# Patient Record
Sex: MALE | Race: BLACK OR AFRICAN AMERICAN | NOT HISPANIC OR LATINO | URBAN - NONMETROPOLITAN AREA
[De-identification: names, ages, dates, MRNs, and addresses within clinical notes are randomized per-mention and may not be internally consistent; named-entity substitution may affect disease eponyms.]

---

## 2020-11-12 ENCOUNTER — OFFICE VISIT (OUTPATIENT)
Dept: URBAN - NONMETROPOLITAN AREA CLINIC 2 | Facility: CLINIC | Age: 65
End: 2020-11-12
Payer: MEDICARE

## 2020-11-12 DIAGNOSIS — K51.00 ULCERATIVE PANCOLITIS WITHOUT COMPLICATION: ICD-10-CM

## 2020-11-12 PROCEDURE — 99213 OFFICE O/P EST LOW 20 MIN: CPT | Performed by: INTERNAL MEDICINE

## 2020-11-12 PROCEDURE — G8427 DOCREV CUR MEDS BY ELIG CLIN: HCPCS | Performed by: INTERNAL MEDICINE

## 2020-11-12 PROCEDURE — G8420 CALC BMI NORM PARAMETERS: HCPCS | Performed by: INTERNAL MEDICINE

## 2020-11-12 PROCEDURE — G8482 FLU IMMUNIZE ORDER/ADMIN: HCPCS | Performed by: INTERNAL MEDICINE

## 2020-11-12 RX ORDER — EXTENDED PHENYTOIN SODIUM 30 MG/1
CAPSULE ORAL
Qty: 0 | Refills: 0 | Status: ACTIVE | COMMUNITY
Start: 1900-01-01

## 2020-11-12 RX ORDER — BALSALAZIDE DISODIUM 750 MG/1
TAKE 3 CAPSULES (2,250 MG) BY ORAL ROUTE 3 TIMES PER DAY FOR 90 DAYS CAPSULE ORAL
Qty: 810 | Refills: 3 | Status: ACTIVE | COMMUNITY
Start: 2020-05-12 | End: 2021-05-07

## 2020-11-12 RX ORDER — ATORVASTATIN CALCIUM 20 MG/1
TABLET, FILM COATED ORAL
Qty: 0 | Refills: 0 | Status: ACTIVE | COMMUNITY
Start: 1900-01-01

## 2020-11-12 RX ORDER — MESALAMINE 1000 MG/1
SUPPOSITORY RECTAL
Qty: 0 | Refills: 0 | Status: ACTIVE | COMMUNITY
Start: 1900-01-01

## 2020-11-12 RX ORDER — BALSALAZIDE DISODIUM 750 MG/1
CAPSULE ORAL
Qty: 0 | Refills: 0 | Status: ACTIVE | COMMUNITY
Start: 1900-01-01

## 2020-11-12 RX ORDER — AMLODIPINE BESYLATE 10 MG/1
TABLET ORAL
Qty: 0 | Refills: 0 | Status: ACTIVE | COMMUNITY
Start: 1900-01-01

## 2020-11-12 NOTE — HPI-TODAY'S VISIT:
Mr. Salazar returns for follow-up of ulcerative colitis.  He is in deep remission and is on mesalamine for this.  He has not had a flare in years.  He is without complaints today.  He is due for his 3 year colonoscopy in May 2021.  5/12/20: Mr. Alexey Salazar returns for follow-up of ulcerative pancolitis.  Telephone call is used in lieu of video chat as he does not have access to video chat technology.  Since his last clinic visit, he has been doing well.  He remains on balsalazide TID and his this controls his diarrhea.  Previously rancho did not when he was diagnosed in 2000.  His last colonoscopy was 5/2018 and showed no active colitis, repeat in 3 years.

## 2020-11-13 ENCOUNTER — OFFICE VISIT (OUTPATIENT)
Dept: URBAN - NONMETROPOLITAN AREA CLINIC 2 | Facility: CLINIC | Age: 65
End: 2020-11-13

## 2021-05-13 ENCOUNTER — WEB ENCOUNTER (OUTPATIENT)
Dept: URBAN - NONMETROPOLITAN AREA CLINIC 2 | Facility: CLINIC | Age: 66
End: 2021-05-13

## 2021-05-13 ENCOUNTER — OFFICE VISIT (OUTPATIENT)
Dept: URBAN - NONMETROPOLITAN AREA CLINIC 2 | Facility: CLINIC | Age: 66
End: 2021-05-13
Payer: MEDICARE

## 2021-05-13 DIAGNOSIS — K51.00 ULCERATIVE PANCOLITIS WITHOUT COMPLICATION: ICD-10-CM

## 2021-05-13 PROCEDURE — 99213 OFFICE O/P EST LOW 20 MIN: CPT | Performed by: INTERNAL MEDICINE

## 2021-05-13 RX ORDER — BALSALAZIDE DISODIUM 750 MG/1
3 CAPSULES CAPSULE ORAL
Qty: 240 | Refills: 3 | OUTPATIENT
Start: 2021-05-13 | End: 2022-01-07

## 2021-05-13 RX ORDER — EXTENDED PHENYTOIN SODIUM 30 MG/1
CAPSULE ORAL
Qty: 0 | Refills: 0 | Status: ACTIVE | COMMUNITY
Start: 1900-01-01

## 2021-05-13 RX ORDER — BALSALAZIDE DISODIUM 750 MG/1
CAPSULE ORAL
Qty: 0 | Refills: 0 | Status: ACTIVE | COMMUNITY
Start: 1900-01-01

## 2021-05-13 RX ORDER — MESALAMINE 1000 MG/1
SUPPOSITORY RECTAL
Qty: 0 | Refills: 0 | Status: ACTIVE | COMMUNITY
Start: 1900-01-01

## 2021-05-13 RX ORDER — AMLODIPINE BESYLATE 10 MG/1
TABLET ORAL
Qty: 0 | Refills: 0 | Status: ACTIVE | COMMUNITY
Start: 1900-01-01

## 2021-05-13 RX ORDER — ATORVASTATIN CALCIUM 20 MG/1
TABLET, FILM COATED ORAL
Qty: 0 | Refills: 0 | Status: ACTIVE | COMMUNITY
Start: 1900-01-01

## 2021-05-13 NOTE — HPI-TODAY'S VISIT:
5/13/21: Mr. Salazar returns for follow-up of ulcerative colitis.  He is in deep remission and is on mesalamine for this.  Today he has no GI complaints.  He continues to take Colazal 9 pills daily and has been on this for almost 20 years now.   11/2020: Mr. Salazar returns for follow-up of ulcerative colitis.  He is in deep remission and is on mesalamine for this.  He has not had a flare in years.  He is without complaints today.  He is due for his 3 year colonoscopy in May 2021.  5/12/20: Mr. Alexey Salazar returns for follow-up of ulcerative pancolitis.  Telephone call is used in lieu of video chat as he does not have access to video chat technology.  Since his last clinic visit, he has been doing well.  He remains on balsalazide TID and his this controls his diarrhea.  Previously rancho did not when he was diagnosed in 2000.  His last colonoscopy was 5/2018 and showed no active colitis, repeat in 3 years.

## 2021-06-07 ENCOUNTER — CLAIMS CREATED FROM THE CLAIM WINDOW (OUTPATIENT)
Dept: URBAN - METROPOLITAN AREA CLINIC 4 | Facility: CLINIC | Age: 66
End: 2021-06-07
Payer: MEDICARE

## 2021-06-07 ENCOUNTER — OFFICE VISIT (OUTPATIENT)
Dept: URBAN - NONMETROPOLITAN AREA SURGERY CENTER 1 | Facility: SURGERY CENTER | Age: 66
End: 2021-06-07
Payer: MEDICARE

## 2021-06-07 DIAGNOSIS — K51.00 CHRONIC PANCOLONIC ULCERATIVE COLITIS: ICD-10-CM

## 2021-06-07 DIAGNOSIS — K63.89 JEJUNAL POLYP: ICD-10-CM

## 2021-06-07 PROCEDURE — 88305 TISSUE EXAM BY PATHOLOGIST: CPT | Performed by: PATHOLOGY

## 2021-06-07 PROCEDURE — 45380 COLONOSCOPY AND BIOPSY: CPT | Performed by: INTERNAL MEDICINE

## 2021-06-07 PROCEDURE — G8907 PT DOC NO EVENTS ON DISCHARG: HCPCS | Performed by: INTERNAL MEDICINE

## 2021-08-04 NOTE — PHYSICAL EXAM CONSTITUTIONAL:
Bed: 13  Expected date:   Expected time:   Means of arrival:   Comments:  4 y/o     Jay Hughes RN  08/04/21 5316 well developed, well nourished , in no acute distress , ambulating without difficulty , normal communication ability

## 2021-11-18 ENCOUNTER — OFFICE VISIT (OUTPATIENT)
Dept: URBAN - NONMETROPOLITAN AREA CLINIC 2 | Facility: CLINIC | Age: 66
End: 2021-11-18
Payer: MEDICARE

## 2021-11-18 DIAGNOSIS — K51.00 ULCERATIVE PANCOLITIS WITHOUT COMPLICATION: ICD-10-CM

## 2021-11-18 PROCEDURE — 99213 OFFICE O/P EST LOW 20 MIN: CPT | Performed by: INTERNAL MEDICINE

## 2021-11-18 RX ORDER — BALSALAZIDE DISODIUM 750 MG/1
3 CAPSULES CAPSULE ORAL
Qty: 240 | Refills: 3 | Status: ACTIVE | COMMUNITY
Start: 2021-05-13 | End: 2022-01-07

## 2021-11-18 RX ORDER — EXTENDED PHENYTOIN SODIUM 30 MG/1
CAPSULE ORAL
Qty: 0 | Refills: 0 | Status: ACTIVE | COMMUNITY
Start: 1900-01-01

## 2021-11-18 RX ORDER — BALSALAZIDE DISODIUM 750 MG/1
CAPSULE ORAL
Qty: 0 | Refills: 0 | Status: ACTIVE | COMMUNITY
Start: 1900-01-01

## 2021-11-18 RX ORDER — BALSALAZIDE DISODIUM 750 MG/1
3 CAPSULES CAPSULE ORAL
Qty: 240 | Refills: 3 | OUTPATIENT

## 2021-11-18 RX ORDER — ATORVASTATIN CALCIUM 20 MG/1
TABLET, FILM COATED ORAL
Qty: 0 | Refills: 0 | Status: ACTIVE | COMMUNITY
Start: 1900-01-01

## 2021-11-18 RX ORDER — AMLODIPINE BESYLATE 10 MG/1
TABLET ORAL
Qty: 0 | Refills: 0 | Status: ACTIVE | COMMUNITY
Start: 1900-01-01

## 2021-11-18 RX ORDER — MESALAMINE 1000 MG/1
SUPPOSITORY RECTAL
Qty: 0 | Refills: 0 | Status: ACTIVE | COMMUNITY
Start: 1900-01-01

## 2021-11-18 NOTE — HPI-TODAY'S VISIT:
11/18/21: Mr. Mr. Salazar returns for follow-up of ulcerative colitis.  He is in deep remission and is on mesalamine for this.  Today he has no GI complaints.  He continues to take Colazal 9 pills daily and has been on this for almost 20 years now.    5/13/21: Mr. Salazar returns for follow-up of ulcerative colitis.  He is in deep remission and is on mesalamine for this.  Today he has no GI complaints.  He continues to take Colazal 9 pills daily and has been on this for almost 20 years now.   11/2020: Mr. Salazar returns for follow-up of ulcerative colitis.  He is in deep remission and is on mesalamine for this.  He has not had a flare in years.  He is without complaints today.  He is due for his 3 year colonoscopy in May 2021.  5/12/20: Mr. Alexey Salazar returns for follow-up of ulcerative pancolitis.  Telephone call is used in lieu of video chat as he does not have access to video chat technology.  Since his last clinic visit, he has been doing well.  He remains on balsalazide TID and his this controls his diarrhea.  Previously rancho did not when he was diagnosed in 2000.  His last colonoscopy was 5/2018 and showed no active colitis, repeat in 3 years.

## 2022-11-21 ENCOUNTER — TELEPHONE ENCOUNTER (OUTPATIENT)
Dept: URBAN - NONMETROPOLITAN AREA CLINIC 2 | Facility: CLINIC | Age: 67
End: 2022-11-21

## 2022-11-21 RX ORDER — MESALAMINE 1000 MG/1
1 SUPPOSITORY AT BEDTIME SUPPOSITORY RECTAL ONCE A DAY
Qty: 90 | Refills: 3

## 2023-02-16 ENCOUNTER — OFFICE VISIT (OUTPATIENT)
Dept: URBAN - NONMETROPOLITAN AREA CLINIC 2 | Facility: CLINIC | Age: 68
End: 2023-02-16
Payer: MEDICARE

## 2023-02-16 VITALS
HEIGHT: 74 IN | WEIGHT: 160 LBS | HEART RATE: 87 BPM | BODY MASS INDEX: 20.53 KG/M2 | SYSTOLIC BLOOD PRESSURE: 142 MMHG | TEMPERATURE: 98.3 F | DIASTOLIC BLOOD PRESSURE: 90 MMHG

## 2023-02-16 DIAGNOSIS — K51.80 CHRONIC PANCOLONIC ULCERATIVE COLITIS: ICD-10-CM

## 2023-02-16 PROBLEM — 444548001: Status: ACTIVE | Noted: 2020-11-12

## 2023-02-16 PROCEDURE — 99213 OFFICE O/P EST LOW 20 MIN: CPT | Performed by: NURSE PRACTITIONER

## 2023-02-16 RX ORDER — BALSALAZIDE DISODIUM 750 MG/1
CAPSULE ORAL
Qty: 0 | Refills: 0 | Status: ACTIVE | COMMUNITY
Start: 1900-01-01

## 2023-02-16 RX ORDER — ATORVASTATIN CALCIUM 20 MG/1
TABLET, FILM COATED ORAL
Qty: 0 | Refills: 0 | Status: ACTIVE | COMMUNITY
Start: 1900-01-01

## 2023-02-16 RX ORDER — EXTENDED PHENYTOIN SODIUM 30 MG/1
CAPSULE ORAL
Qty: 0 | Refills: 0 | Status: ACTIVE | COMMUNITY
Start: 1900-01-01

## 2023-02-16 RX ORDER — MESALAMINE 1000 MG/1
1 SUPPOSITORY AT BEDTIME SUPPOSITORY RECTAL ONCE A DAY
Qty: 90 | Refills: 3 | Status: ACTIVE | COMMUNITY

## 2023-02-16 RX ORDER — AMLODIPINE BESYLATE 10 MG/1
TABLET ORAL
Qty: 0 | Refills: 0 | Status: ACTIVE | COMMUNITY
Start: 1900-01-01

## 2023-02-16 RX ORDER — BALSALAZIDE DISODIUM 750 MG/1
3 CAPSULES CAPSULE ORAL
Qty: 240 | Refills: 3 | OUTPATIENT

## 2023-02-16 RX ORDER — BALSALAZIDE DISODIUM 750 MG/1
3 CAPSULES CAPSULE ORAL
Qty: 240 | Refills: 3 | Status: ACTIVE | COMMUNITY

## 2023-02-16 NOTE — HPI-TODAY'S VISIT:
Mr.  Mr. Salazar returns for follow-up of ulcerative colitis. past hx of pan colitis. He is in deep remission and is on mesalamine for this.  Today he has no GI complaints.  He continues to take Colazal 9 pills daily and has been on this for almost 20 years now.  occasionally requires mesalamine supp for bleeding, but hasn't required in some time. SB

## 2023-09-15 ENCOUNTER — TELEPHONE ENCOUNTER (OUTPATIENT)
Dept: URBAN - NONMETROPOLITAN AREA CLINIC 2 | Facility: CLINIC | Age: 68
End: 2023-09-15

## 2023-09-15 RX ORDER — BALSALAZIDE DISODIUM 750 MG/1
3 CAPSULES CAPSULE ORAL
Qty: 240 | Refills: 3
End: 2024-05-12

## 2023-11-16 ENCOUNTER — CLAIMS CREATED FROM THE CLAIM WINDOW (OUTPATIENT)
Dept: URBAN - NONMETROPOLITAN AREA CLINIC 2 | Facility: CLINIC | Age: 68
End: 2023-11-16
Payer: MEDICARE

## 2023-11-16 ENCOUNTER — DASHBOARD ENCOUNTERS (OUTPATIENT)
Age: 68
End: 2023-11-16

## 2023-11-16 ENCOUNTER — LAB OUTSIDE AN ENCOUNTER (OUTPATIENT)
Dept: URBAN - NONMETROPOLITAN AREA CLINIC 2 | Facility: CLINIC | Age: 68
End: 2023-11-16

## 2023-11-16 VITALS
WEIGHT: 158 LBS | HEART RATE: 87 BPM | HEIGHT: 74 IN | SYSTOLIC BLOOD PRESSURE: 123 MMHG | DIASTOLIC BLOOD PRESSURE: 79 MMHG | TEMPERATURE: 98.1 F | BODY MASS INDEX: 20.28 KG/M2

## 2023-11-16 DIAGNOSIS — K51.80 CHRONIC PANCOLONIC ULCERATIVE COLITIS: ICD-10-CM

## 2023-11-16 DIAGNOSIS — D84.9 IMMUNOSUPPRESSED STATUS: ICD-10-CM

## 2023-11-16 DIAGNOSIS — K51.00 ULCERATIVE PANCOLITIS WITHOUT COMPLICATION: ICD-10-CM

## 2023-11-16 PROBLEM — 38013005: Status: ACTIVE | Noted: 2023-11-16

## 2023-11-16 PROCEDURE — 99214 OFFICE O/P EST MOD 30 MIN: CPT | Performed by: NURSE PRACTITIONER

## 2023-11-16 RX ORDER — BALSALAZIDE DISODIUM 750 MG/1
CAPSULE ORAL
Qty: 0 | Refills: 0 | Status: ACTIVE | COMMUNITY
Start: 1900-01-01

## 2023-11-16 RX ORDER — MESALAMINE 1000 MG/1
1 SUPPOSITORY AT BEDTIME SUPPOSITORY RECTAL ONCE A DAY
Qty: 90 | Refills: 3 | Status: ACTIVE | COMMUNITY

## 2023-11-16 RX ORDER — BALSALAZIDE DISODIUM 750 MG/1
3 CAPSULES CAPSULE ORAL
Qty: 240 | Refills: 3 | Status: ACTIVE | COMMUNITY
End: 2024-05-12

## 2023-11-16 RX ORDER — AMLODIPINE BESYLATE 10 MG/1
TABLET ORAL
Qty: 0 | Refills: 0 | Status: ACTIVE | COMMUNITY
Start: 1900-01-01

## 2023-11-16 RX ORDER — ATORVASTATIN CALCIUM 20 MG/1
TABLET, FILM COATED ORAL
Qty: 0 | Refills: 0 | Status: ACTIVE | COMMUNITY
Start: 1900-01-01

## 2023-11-16 RX ORDER — BALSALAZIDE DISODIUM 750 MG/1
3 CAPSULES CAPSULE ORAL
Qty: 810 | Refills: 3 | OUTPATIENT

## 2023-11-16 RX ORDER — EXTENDED PHENYTOIN SODIUM 30 MG/1
CAPSULE ORAL
Qty: 0 | Refills: 0 | Status: ACTIVE | COMMUNITY
Start: 1900-01-01

## 2023-11-16 NOTE — HPI-TODAY'S VISIT:
Mr.  Mr. Salazar returns for follow-up of ulcerative colitis. past hx of pan colitis. He is in deep remission and is on mesalamine for this.  Today he has no GI complaints.  He continues to take Colazal 9 pills daily and has been on this for almost 20 years now.  occasionally requires mesalamine supp for bleeding, but hasn't required in some time. follows with Dr Alan. Has medtronic pacemaker, but not AICD SB

## 2023-11-16 NOTE — PHYSICAL EXAM EYES:
Conjuntivae and eyelids appear normal,  Sclerae : White without injection  [facility]        [address]  [place], [address] [place]   Dept: [phone]        Follow up Note      Rothman Saver     Date of Visit:  09/28/22     CC:  Patient presents for follow up   Chief Complaint   Patient presents with    Follow-up     Left Hip steroid injection under fluoroscopic guidance. HPI:    Pain is better. Change in quality of symptoms:no. Medication side effects:none. Recent diagnostic testing:none. Recent interventional procedures:L hip pain with 80% relief. She has been on anticoagulation medications to include ASA and has not been on herbal supplements. She is diabetic. Imaging:   10/2021 lumbar MRI -      BONES/ALIGNMENT: There is normal alignment of the spine. Mild-to-moderate   levoscoliosis of the lumbar spine is again noted. The vertebral body heights   are maintained. There is spurring and disc desiccation at multiple levels   with mild-to-moderate disc space narrowing at L1-2, L2-3, L3-4 and L4-5. Slight subchondral signal change is seen, most notably at L2-3 and L4-5. This is most likely related to degenerative disc disease. Otherwise, the   bone marrow signal appears unremarkable. SPINAL CORD: The conus terminates normally. SOFT TISSUES: No paraspinal mass identified. The spinal canal is somewhat congenitally narrowed. L1-L2: There is disc bulge and severe right and moderate left posterior facet   degenerative change with ligamentum flavum hypertrophy causing moderate   central canal stenosis. There is mild right and moderate left neural   foraminal narrowing. L2-L3: There is a disc bulge and severe bilateral posterior facet   degenerative change with ligamentum flavum hypertrophy causing severe central   canal stenosis. There is right lateral disc protrusion causing severe right   neural foraminal narrowing. There is moderate left neural foraminal   narrowing.   In addition, there is a central and left paramedian disc   herniation with inferior extension of small extruded fragment behind the L3   vertebral body causing severe left lateral recess stenosis. L3-L4: There is disc bulge and severe bilateral posterior facet degenerative   change and ligamentum flavum hypertrophy causing severe central canal   stenosis. There is severe bilateral neural foraminal stenosis. The findings   at L2-3 and L3-4 are worse compared to the prior study. L4-L5: There is mild disc bulge with severe left and moderate right posterior   facet degenerative change with ligamentum flavum hypertrophy. This causes   moderate left subarticular recess stenosis. No significant central canal   stenosis is seen. There is severe bilateral neural foraminal narrowing. L5-S1: There is disc bulge and moderate to severe bilateral posterior facet   degenerative change, greater on the left with superimposed right   posterolateral and lateral disc protrusion causing severe right subarticular   recess stenosis. There is also severe bilateral neural foraminal narrowing. No significant central canal stenosis seen. Impression   1. Advanced degenerative change with mild to moderate levoscoliosis. 2. Multilevel central canal stenosis, including severe central canal stenosis   at L2-3 and L3-4 and moderate stenosis at L1-2. Moderate left subarticular   recess stenosis at L4-5 and severe right subarticular recess stenosis at   L5-S1.   3. Central and left paramedian disc herniation at L2-3 with inferiorly   extruded disc fragment causing severe left lateral recess stenosis. 4. Multilevel bilateral neural foraminal stenosis including severe foraminal   stenosis on the left at L2-3 and bilaterally at L3-4, L4-5 and L5-S1.   5. Overall, the findings are worse compared to the prior study.    6.  The findings were sent to the Radiology Results [id number] at   11:57 am on 10/4/2021to be communicated to a licensed caregiver. 2021 xray L hip -  FINDINGS:   Demonstrated is joint space narrowing with marginal acetabular are   osteophyte. A femoral head maintains normal contour. No acute fracture identified. Bony pelvis is intact. Enthesopathy noted at   the the iliac wings           Impression   Moderate to severe DJD      Xray pelvis 3/2019 -      1. No evidence of acute fracture or hip dislocation. 2. Mild osteoarthritis involving both hips but with prominent bridging   of the superior acetabulum. This can contribute to impingement. 3. At least moderately severe degenerative changes in the lower lumbar   spine. CT lumbar 3/2019 -   The study is limited by the patient's body habitus which degrades the   imaging quality. No acute fracture or dislocation is seen. Moderate loss of intervertebral disc height is seen throughout the   visualized spine. Moderate to severe bilateral neural foraminal   narrowing is seen at L3-L4, L4-L5 and L5-S1 due to large vertebral   body and facet joint osteophytes. Mild levoconvex scoliosis of the   lumbar spine is seen. Moderate degenerative changes with periarticular osteophytes and loss   of joint space are seen within the sacroiliac joints      Lumbar MRI 2018 -   Findings: There is normal sagittal alignment visualized lumbar spine. No STIR   hyperintensity to suggest an acute fracture or ligamentous injury is   noted. Limited evaluation secondary to technique. Disc desiccation   throughout the visualized lumbar intervertebral discs. Bone marrow   signal is relatively homogeneous throughout. Visualized portions of   the kidneys and aorta are grossly unremarkable although limited in   evaluation. Sagittal imaging only T11-T12: Mild to moderate circumferential disc   bulge. Moderate spinal canal stenosis is noted on this limited study. Moderate to moderately severe bilateral neural foraminal narrowing.    Limited evaluation secondary to sagittal imaging only. Sagittal imaging only T12-L1: No evidence of posterior disc contour   abnormality, spinal canal stenosis, neural foraminal narrowing or   spinal nerve root abutment/impingement. .       L1-L2: Large circumferential disc bulge. Thecal sac measures   approximately 0.83 cm anterior posterior dimension of the central   spinal canal. This is secondary to a combination of circumferential   disc bulge and posterior epidural lipomatosis. Moderately severe left   and right neural foraminal narrowing with suspected   abutment/impingement exiting right L1 spinal nerve root. Moderate   bilateral facet osteoarthropathy. Underlying crowding/compression of   the cauda equina nerve roots. L2-L3: Moderate circumferential disc bulge. Moderate bilateral facet   osteoarthropathy. Moderate to moderately severe left and right neural   foraminal narrowing. There is abutment of the exiting left L2 spinal   nerve root. Thecal sac measures approximately 0.92 cm anterior   posterior dimension and central spinal canal. There is questionable   abnormal appearance of the cauda equina nerve roots in this region. L3-L4: Severe circumferential disc bulge with a centrally located   slight inferior directed disc extrusion. Severe facet   osteoarthropathy. Moderate spinal canal stenosis. Severe left and   right neural foraminal narrowing. Abutment/impingement exiting   bilateral L3 spinal nerve roots. Moderately severe thecal sac neural   foraminal narrowing and spinal canal stenosis secondary to a   combination of the facet osteoarthropathy and posterior epidural   hematoma stenosis with crowding and compression of cauda equina nerve   roots. L4-L5: Moderately large circumferential disc bulge. Severe bilateral   facet osteoarthropathy. Severe left and moderately severe right neural   foraminal narrowing with abutment/impingement of exiting bilateral L4   spinal nerve roots.  Extensive facet Neuropathy     JODI treated with BiPAP     Scoliosis     Spinal meningocele [facility])        Past Surgical History:   Procedure Laterality Date    CHOLECYSTECTOMY  2009    CYSTOSCOPY Left 01/14/2018    left stent placement    DILATION AND CURETTAGE OF UTERUS      younger age    [de-identified] SURGERY Left 9/22/2022    LEFT HIP INJECTION performed by Karl Peña DO at [address]    LITHOTRIPSY Right 02/15/2018    Cystoscopy;Stent Removal    MARGUERITE-EN-Y GASTRIC BYPASS N/A 5/31/2022    GASTRIC BYPASS MARGUERITE-EN-Y LAPAROSCOPIC performed by Vivian Hong MD at [address] 6/22/2018    EGD BIOPSY performed by Vivian Hong MD at [address] [place] [address] 8/20/2021    EGD BIOPSY performed by Vivian Hong MD at [place] [address]       Prior to Admission medications    Medication Sig Start Date End Date Taking? Authorizing Provider   bumetanide (BUMEX) 1 MG tablet TAKE 1 TABLET BY MOUTH EVERY OTHER DAY ALTERNATING WITH 2 TABLETS EVERY OTHER DAY 9/26/22  Yes Austin Amado PA-C   lactulose [facility]) 10 GM/15ML solution Take 15 mLs by mouth every evening 9/8/22 10/8/22 Yes Karl Peña DO   gabapentin (NEURONTIN) 600 MG tablet Take 1 tablet by mouth 3 times daily for 30 days. 9/1/22 10/1/22 Yes Karl Peña DO   oxyCODONE-acetaminophen (PERCOCET) 5-325 MG per tablet Take 1 tablet by mouth 3 times daily as needed for Pain for up to 30 days. Intended supply: 30 days.  Take lowest dose possible to manage pain 9/4/22 10/4/22 Yes Karl Peña DO   Methylnaltrexone Bromide 150 MG TABS Take 450 mg by mouth every morning 9/1/22 10/1/22 Yes Karl Peña DO   pantoprazole (PROTONIX) 20 MG tablet TAKE 1 TABLET BY MOUTH DAILY 8/11/22  Yes Austin Amado PA-C   TRULICITY 6.00 MR/6.1OB SOPN ADMINISTER 0.75 MG UNDER THE SKIN 1 TIME A WEEK 7/26/22  Yes Austin Amado PA-C   hydrALAZINE (APRESOLINE) 50 MG tablet TAKE 1 TABLET BY MOUTH EVERY 8 HOURS 7/25/22  Yes Austin Amado PA-C Dulaglutide (TRULICITY) 6.58 SR/8.3GM SOPN Inject 0.75 mg into the skin once a week monday 7/25/22  Yes Rula Rahman PA-C   isosorbide dinitrate (ISORDIL) 20 MG tablet TAKE 1 TABLET BY MOUTH THREE TIMES DAILY 6/29/22  Yes Leisa Curran MD   metoprolol succinate (TOPROL XL) 100 MG extended release tablet TAKE 1 TABLET BY MOUTH TWICE DAILY 6/27/22  Yes Rula Rahman PA-C   atorvastatin (LIPITOR) 80 MG tablet TAKE 1 TABLET BY MOUTH EVERY NIGHT 6/27/22  Yes Rula Rahman PA-C   fluticasone (FLONASE) 50 MCG/ACT nasal spray SHAKE LIQUID AND USE 2 SPRAYS IN [facility] NOSTRIL DAILY 6/17/22  Yes Rula Rahman PA-C   aspirin 81 MG chewable tablet Take 1 tablet by mouth daily 6/13/22  Yes Rula Rahman PA-C   spironolactone (ALDACTONE) 25 MG tablet TAKE 1 TABLET BY MOUTH EVERY DAY 6/13/22  Yes Rula Rahman PA-C   sacubitril-valsartan (ENTRESTO)  MG per tablet Take 1 tablet by mouth 2 times daily 6/13/22  Yes Rula Rahman PA-C   mineral oil-hydrophilic petrolatum (AQUAPHOR) ointment Apply topically as needed.  6/13/22  Yes Rula Rahman PA-C   citalopram (CELEXA) 40 MG tablet TAKE 1 TABLET BY MOUTH DAILY 5/25/22  Yes Rula Rahman PA-C   omeprazole (PRILOSEC) 20 MG delayed release capsule Take 1 capsule by mouth Daily 5/18/22 5/18/23 Yes Hamzah Varghese MD   potassium chloride (KLOR-CON M) 20 MEQ extended release tablet TAKE 1 TABLET BY MOUTH DAILY WITH BREAKFAST 4/28/22  Yes Rula Rahman PA-C   ondansetron (ZOFRAN-ODT) 4 MG disintegrating tablet Place 2 tablets under the tongue every 8 hours as needed for Nausea or Vomiting 4/13/22  Yes Rula Rahman PA-C   albuterol sulfate HFA (PROAIR HFA) 108 (90 Base) MCG/ACT inhaler Inhale 2 puffs into the lungs every 4 hours as needed for Wheezing or Shortness of Breath 4/13/22  Yes Rula Rahman PA-C   cetirizine (ZYRTEC) 10 MG tablet Take 1 tablet by mouth daily 1/3/22  Yes Rula Rahman PA-C   Cholecalciferol (VITAMIN D) 50 MCG (2000 UT) CAPS capsule Take 1 capsule by children: Not on file    Years of education: Not on file    Highest education level: Not on file   Occupational History    Occupation: direct care staff/counselor   Tobacco Use    Smoking status: Some Days     Packs/day: 0.25     Years: 17.00     Pack years: 4.25     Types: Cigarettes     Start date: 3/27/2001     Last attempt to quit: 10/5/2018     Years since quitting: 3.9    Smokeless tobacco: Never    Tobacco comments:     occasionally has 1 cigarette, very stressed now   Vaping Use    Vaping Use: Former   Substance and Sexual Activity    Alcohol use: No    Drug use: No    Sexual activity: Yes   Other Topics Concern    Not on file   Social History Narrative    Not on file     Social Determinants of Health     Financial Resource Strain: Not on file   Food Insecurity: Not on file   Transportation Needs: Not on file   Physical Activity: Not on file   Stress: Not on file   Social Connections: Not on file   Intimate Partner Violence: Not on file   Housing Stability: Not on file       Family History   Problem Relation Age of Onset    Stroke Mother     Arthritis Mother     Hypertension Mother     Asthma Mother     Fibromyalgia Mother     Cancer Father     Hypertension Father     Heart Attack Father     Asthma Father        REVIEW OF SYSTEMS:     Hadley Flatten denies fever/chills, chest pain, shortness of breath, new bowel or bladder complaints. All other review of systems was negative. PHYSICAL EXAMINATION:      /85   Pulse 77   Temp 97.2 °F (36.2 °C) (Infrared)   Resp 16   Ht 5' 8\" (1.727 m)   Wt (!) 322 lb (146.1 kg)   SpO2 97%   BMI 48.96 kg/m²     General:       General appearance:pleasant and well-hydrated, in no distress and A & O x3  Build: Morbidly obese  Function:in wheelchair     HEENT:     Head:normocephalic, atraumatic  Pupils:regular, round, equal  Sclera: icterus absent     Lungs:     Breathing:normal breathing pattern     Abdomen:     Shape:obese and non-distended  Tenderness:none  Guarding:none     Lumbar spine:     Spine inspection:normal  CVA tenderness:No  Palpation:tenderness paravertebral muscles, left, right positive. Range of motion:abnormal moderately in lateral bending, flexion, extension rotation bilateral and is painful.      Musculoskeletal:     Trigger points in Paraveteral:absent bilaterally  SI joint tenderness:negative right, negative left              DAVE test:not done right, not done left  Piriformis tenderness:negative right, negative left  Trochanteric bursa tenderness:negative right, negative left  SLR:negative right, negative left, sitting     Extremities:     Tremors:None bilaterally upper and lower  Range of motion:pain with internal rotation of left hip negative  Intact:Yes  Varicose veins:absent  Pulses:present Lt radial  Cyanosis:none  Edema:none x all 4 extremities     Neurological:     Sensory:normal to light touch BLE     Motor:                Right Quadriceps5/5          Left Quadriceps5/5           Right Gastrocnemius5/5    Left Gastrocnemius5/5  Right Ant Tibialis5/5  Left Ant Tibialis5/5     Reflexes:  (not assessed today)  Right Quadriceps reflex2+  Left Quadriceps reflex2+  Right Achilles reflex2+  Left Achilles reflex2+    Gait:in a wheelchair     Dermatology:     Skin:no rashes or lesions noted     Assessment/Plan:        Previously seen in 2019 for LBP and LLE pain (nondermatomal)  Upon 2022 consultation pt reports diffuse pain consistent with hyperalgesia, LBP BLE, and severe L hip pain  Lumbar MRI shows significant degenerative changes with multilevel stenosis  L hip xray shows significant OA changes  PMHx: JODI (compliant with cpap), asthma, morbid obesity s/p gastric bypass 5/2022 and as of 7/2022 has lost 30 lbs, CHF, DM, GERD, DLD, HTN     She re-presented recently after having not been seen in almost 3 years as referred by Jean-Pierre Baptiste change in practice status  Overall patient is nonfunctional,

## 2023-12-11 ENCOUNTER — ERX REFILL RESPONSE (OUTPATIENT)
Dept: URBAN - NONMETROPOLITAN AREA CLINIC 2 | Facility: CLINIC | Age: 68
End: 2023-12-11

## 2023-12-11 RX ORDER — MESALAMINE 1000 MG/1
USE 1 SUPPOSITORY RECTALLY AT BEDTIME ONCE DAILY SUPPOSITORY RECTAL
Qty: 90 SUPPOSITORIES | Refills: 3 | OUTPATIENT

## 2023-12-11 RX ORDER — MESALAMINE 1000 MG/1
1 SUPPOSITORY AT BEDTIME SUPPOSITORY RECTAL ONCE A DAY
Qty: 90 | Refills: 3 | OUTPATIENT

## 2024-05-30 ENCOUNTER — TELEPHONE ENCOUNTER (OUTPATIENT)
Dept: URBAN - NONMETROPOLITAN AREA CLINIC 2 | Facility: CLINIC | Age: 69
End: 2024-05-30

## 2024-06-06 ENCOUNTER — CLAIMS CREATED FROM THE CLAIM WINDOW (OUTPATIENT)
Dept: URBAN - NONMETROPOLITAN AREA SURGERY CENTER 1 | Facility: SURGERY CENTER | Age: 69
End: 2024-06-06
Payer: MEDICARE

## 2024-06-06 ENCOUNTER — CLAIMS CREATED FROM THE CLAIM WINDOW (OUTPATIENT)
Dept: URBAN - METROPOLITAN AREA CLINIC 4 | Facility: CLINIC | Age: 69
End: 2024-06-06
Payer: MEDICARE

## 2024-06-06 DIAGNOSIS — K51.00 ACUTE ULCERATIVE PANCOLITIS: ICD-10-CM

## 2024-06-06 DIAGNOSIS — K63.89 OTHER SPECIFIED DISEASES OF INTESTINE: ICD-10-CM

## 2024-06-06 DIAGNOSIS — K51.00 CHRONIC ULCERATIVE PANCOLITIS: ICD-10-CM

## 2024-06-06 PROCEDURE — 00811 ANES LWR INTST NDSC NOS: CPT | Performed by: NURSE ANESTHETIST, CERTIFIED REGISTERED

## 2024-06-06 PROCEDURE — 88305 TISSUE EXAM BY PATHOLOGIST: CPT | Performed by: PATHOLOGY

## 2024-06-06 PROCEDURE — 45380 COLONOSCOPY AND BIOPSY: CPT | Performed by: CLINIC/CENTER

## 2024-06-06 PROCEDURE — G8907 PT DOC NO EVENTS ON DISCHARG: HCPCS | Performed by: CLINIC/CENTER

## 2024-06-06 PROCEDURE — 45380 COLONOSCOPY AND BIOPSY: CPT | Performed by: INTERNAL MEDICINE

## 2024-06-06 RX ORDER — AMLODIPINE BESYLATE 10 MG/1
TABLET ORAL
Qty: 0 | Refills: 0 | Status: ACTIVE | COMMUNITY
Start: 1900-01-01

## 2024-06-06 RX ORDER — EXTENDED PHENYTOIN SODIUM 30 MG/1
CAPSULE ORAL
Qty: 0 | Refills: 0 | Status: ACTIVE | COMMUNITY
Start: 1900-01-01

## 2024-06-06 RX ORDER — BALSALAZIDE DISODIUM 750 MG/1
CAPSULE ORAL
Qty: 0 | Refills: 0 | Status: ACTIVE | COMMUNITY
Start: 1900-01-01

## 2024-06-06 RX ORDER — MESALAMINE 1000 MG/1
USE 1 SUPPOSITORY RECTALLY AT BEDTIME ONCE DAILY SUPPOSITORY RECTAL
Qty: 90 SUPPOSITORIES | Refills: 3 | Status: ACTIVE | COMMUNITY

## 2024-06-06 RX ORDER — BALSALAZIDE DISODIUM 750 MG/1
3 CAPSULES CAPSULE ORAL
Qty: 810 | Refills: 3 | Status: ACTIVE | COMMUNITY

## 2024-06-06 RX ORDER — ATORVASTATIN CALCIUM 20 MG/1
TABLET, FILM COATED ORAL
Qty: 0 | Refills: 0 | Status: ACTIVE | COMMUNITY
Start: 1900-01-01

## 2024-08-20 ENCOUNTER — OFFICE VISIT (OUTPATIENT)
Dept: URBAN - NONMETROPOLITAN AREA CLINIC 2 | Facility: CLINIC | Age: 69
End: 2024-08-20

## 2024-10-31 ENCOUNTER — TELEPHONE ENCOUNTER (OUTPATIENT)
Dept: URBAN - NONMETROPOLITAN AREA CLINIC 13 | Facility: CLINIC | Age: 69
End: 2024-10-31

## 2024-10-31 ENCOUNTER — LAB OUTSIDE AN ENCOUNTER (OUTPATIENT)
Dept: URBAN - NONMETROPOLITAN AREA CLINIC 13 | Facility: CLINIC | Age: 69
End: 2024-10-31

## 2024-11-04 ENCOUNTER — TELEPHONE ENCOUNTER (OUTPATIENT)
Dept: URBAN - NONMETROPOLITAN AREA CLINIC 2 | Facility: CLINIC | Age: 69
End: 2024-11-04

## 2024-11-05 ENCOUNTER — OFFICE VISIT (OUTPATIENT)
Dept: URBAN - NONMETROPOLITAN AREA SURGERY CENTER 1 | Facility: SURGERY CENTER | Age: 69
End: 2024-11-05
Payer: MEDICARE

## 2024-11-05 ENCOUNTER — CLAIMS CREATED FROM THE CLAIM WINDOW (OUTPATIENT)
Dept: URBAN - METROPOLITAN AREA CLINIC 4 | Facility: CLINIC | Age: 69
End: 2024-11-05
Payer: MEDICARE

## 2024-11-05 DIAGNOSIS — R13.19 OTHER DYSPHAGIA: ICD-10-CM

## 2024-11-05 DIAGNOSIS — K29.60 OTHER GASTRITIS WITHOUT BLEEDING: ICD-10-CM

## 2024-11-05 DIAGNOSIS — B96.81 BACTERIAL INFECTION DUE TO H. PYLORI: ICD-10-CM

## 2024-11-05 DIAGNOSIS — B96.81 HELICOBACTER PYLORI [H. PYLORI] AS THE CAUSE OF DISEASES CLASSIFIED ELSEWHERE: ICD-10-CM

## 2024-11-05 DIAGNOSIS — K20.80 OTHER ESOPHAGITIS WITHOUT BLEEDING: ICD-10-CM

## 2024-11-05 DIAGNOSIS — K31.89 OTHER DISEASES OF STOMACH AND DUODENUM: ICD-10-CM

## 2024-11-05 DIAGNOSIS — K22.89 OTHER SPECIFIED DISEASE OF ESOPHAGUS: ICD-10-CM

## 2024-11-05 DIAGNOSIS — K20.80 OTHER ESOPHAGITIS: ICD-10-CM

## 2024-11-05 PROCEDURE — 88305 TISSUE EXAM BY PATHOLOGIST: CPT | Performed by: PATHOLOGY

## 2024-11-05 PROCEDURE — 00731 ANES UPR GI NDSC PX NOS: CPT | Performed by: NURSE ANESTHETIST, CERTIFIED REGISTERED

## 2024-11-05 PROCEDURE — 43249 ESOPH EGD DILATION <30 MM: CPT | Performed by: INTERNAL MEDICINE

## 2024-11-05 PROCEDURE — 43239 EGD BIOPSY SINGLE/MULTIPLE: CPT | Performed by: INTERNAL MEDICINE

## 2024-11-05 PROCEDURE — 43249 ESOPH EGD DILATION <30 MM: CPT | Performed by: CLINIC/CENTER

## 2024-11-05 PROCEDURE — 88312 SPECIAL STAINS GROUP 1: CPT | Performed by: PATHOLOGY

## 2024-11-05 PROCEDURE — 43239 EGD BIOPSY SINGLE/MULTIPLE: CPT | Performed by: CLINIC/CENTER

## 2024-11-05 RX ORDER — BALSALAZIDE DISODIUM 750 MG/1
CAPSULE ORAL
Qty: 0 | Refills: 0 | Status: ACTIVE | COMMUNITY
Start: 1900-01-01

## 2024-11-05 RX ORDER — AMLODIPINE BESYLATE 10 MG/1
TABLET ORAL
Qty: 0 | Refills: 0 | Status: ACTIVE | COMMUNITY
Start: 1900-01-01

## 2024-11-05 RX ORDER — BALSALAZIDE DISODIUM 750 MG/1
3 CAPSULES CAPSULE ORAL
Qty: 810 | Refills: 3 | Status: ACTIVE | COMMUNITY

## 2024-11-05 RX ORDER — ATORVASTATIN CALCIUM 20 MG/1
TABLET, FILM COATED ORAL
Qty: 0 | Refills: 0 | Status: ACTIVE | COMMUNITY
Start: 1900-01-01

## 2024-11-05 RX ORDER — MESALAMINE 1000 MG/1
USE 1 SUPPOSITORY RECTALLY AT BEDTIME ONCE DAILY SUPPOSITORY RECTAL
Qty: 90 SUPPOSITORIES | Refills: 3 | Status: ACTIVE | COMMUNITY

## 2024-11-05 RX ORDER — EXTENDED PHENYTOIN SODIUM 30 MG/1
CAPSULE ORAL
Qty: 0 | Refills: 0 | Status: ACTIVE | COMMUNITY
Start: 1900-01-01

## 2024-11-06 ENCOUNTER — TELEPHONE ENCOUNTER (OUTPATIENT)
Dept: URBAN - NONMETROPOLITAN AREA CLINIC 2 | Facility: CLINIC | Age: 69
End: 2024-11-06

## 2024-11-07 ENCOUNTER — OFFICE VISIT (OUTPATIENT)
Dept: URBAN - NONMETROPOLITAN AREA CLINIC 2 | Facility: CLINIC | Age: 69
End: 2024-11-07

## 2024-11-20 ENCOUNTER — TELEPHONE ENCOUNTER (OUTPATIENT)
Dept: URBAN - NONMETROPOLITAN AREA CLINIC 2 | Facility: CLINIC | Age: 69
End: 2024-11-20

## 2024-11-20 RX ORDER — CLARITHROMYCIN 500 MG/1
1 TABLET TABLET, FILM COATED ORAL
Qty: 28 TABLET | Refills: 0 | OUTPATIENT
Start: 2024-11-20 | End: 2024-12-04

## 2024-11-20 RX ORDER — OMEPRAZOLE 20 MG/1
1 CAPSULE CAPSULE, DELAYED RELEASE ORAL TWICE DAILY
Qty: 28 CAPSULE | Refills: 0 | OUTPATIENT
Start: 2024-11-20

## 2024-11-20 RX ORDER — AMOXICILLIN 500 MG/1
2 CAPSULES CAPSULE ORAL
Qty: 56 CAPSULE | Refills: 0 | OUTPATIENT
Start: 2024-11-20 | End: 2024-12-04

## 2024-11-22 ENCOUNTER — TELEPHONE ENCOUNTER (OUTPATIENT)
Dept: URBAN - NONMETROPOLITAN AREA CLINIC 2 | Facility: CLINIC | Age: 69
End: 2024-11-22

## 2024-12-03 ENCOUNTER — TELEPHONE ENCOUNTER (OUTPATIENT)
Dept: URBAN - NONMETROPOLITAN AREA CLINIC 2 | Facility: CLINIC | Age: 69
End: 2024-12-03

## 2024-12-03 RX ORDER — AMOXICILLIN 500 MG/1
2 CAPSULES CAPSULE ORAL
Qty: 56 CAPSULE | Refills: 0
Start: 2024-11-20 | End: 2024-12-17

## 2024-12-03 RX ORDER — CLARITHROMYCIN 500 MG/1
1 TABLET TABLET, FILM COATED ORAL
Qty: 28 TABLET | Refills: 0
Start: 2024-11-20 | End: 2024-12-17

## 2024-12-03 RX ORDER — OMEPRAZOLE 20 MG/1
1 CAPSULE CAPSULE, DELAYED RELEASE ORAL TWICE DAILY
Qty: 28 CAPSULE | Refills: 0
Start: 2024-11-20

## 2025-02-28 ENCOUNTER — OFFICE VISIT (OUTPATIENT)
Dept: URBAN - NONMETROPOLITAN AREA CLINIC 2 | Facility: CLINIC | Age: 70
End: 2025-02-28

## 2025-02-28 VITALS
HEART RATE: 88 BPM | SYSTOLIC BLOOD PRESSURE: 153 MMHG | BODY MASS INDEX: 22.07 KG/M2 | HEIGHT: 74 IN | WEIGHT: 172 LBS | DIASTOLIC BLOOD PRESSURE: 96 MMHG

## 2025-02-28 RX ORDER — EXTENDED PHENYTOIN SODIUM 30 MG/1
CAPSULE ORAL
Qty: 0 | Refills: 0 | Status: ACTIVE | COMMUNITY
Start: 1900-01-01

## 2025-02-28 RX ORDER — AMLODIPINE BESYLATE 10 MG/1
TABLET ORAL
Qty: 0 | Refills: 0 | Status: ACTIVE | COMMUNITY
Start: 1900-01-01

## 2025-02-28 RX ORDER — MESALAMINE 1000 MG/1
USE 1 SUPPOSITORY RECTALLY AT BEDTIME ONCE DAILY SUPPOSITORY RECTAL
Qty: 90 SUPPOSITORIES | Refills: 3 | Status: ACTIVE | COMMUNITY

## 2025-02-28 RX ORDER — OMEPRAZOLE 20 MG/1
1 CAPSULE CAPSULE, DELAYED RELEASE ORAL TWICE DAILY
Qty: 28 CAPSULE | Refills: 0 | Status: ACTIVE | COMMUNITY
Start: 2024-11-20

## 2025-02-28 RX ORDER — BALSALAZIDE DISODIUM 750 MG/1
3 CAPSULES CAPSULE ORAL
Qty: 810 | Refills: 3 | OUTPATIENT

## 2025-02-28 RX ORDER — BALSALAZIDE DISODIUM 750 MG/1
3 CAPSULES CAPSULE ORAL
Qty: 810 | Refills: 3 | Status: ACTIVE | COMMUNITY

## 2025-02-28 RX ORDER — ATORVASTATIN CALCIUM 20 MG/1
TABLET, FILM COATED ORAL
Qty: 0 | Refills: 0 | Status: ACTIVE | COMMUNITY
Start: 1900-01-01

## 2025-02-28 RX ORDER — BALSALAZIDE DISODIUM 750 MG/1
CAPSULE ORAL
Qty: 0 | Refills: 0 | Status: ACTIVE | COMMUNITY
Start: 1900-01-01

## 2025-02-28 NOTE — HPI-TODAY'S VISIT:
Mr.  Mr. Salazar returns for follow-up of ulcerative colitis. past hx of pan colitis. He is in deep remission and is on mesalamine for this.  Today he has no GI complaints.  He continues to take Colazal 9 pills daily and has been on this for almost 20 years now.  occasionally requires mesalamine supp for bleeding, but hasn't required in some time. follows with Dr Alan. Has medtronic pacemaker, but not AICD SB 2/28/2025 Suze returns for follow-up of ulcerative colitis as well as upper endoscopy.  He had a colonoscopy for his ulcerative colitis summer 2024 which was negative.  There was a little bit of colitis on pathology, but his bowels are regular.  He continues his dre all.  His cardiologist reached out to us late 2024 as he had some difficulty with his JORGE LUIS and wanted him to have an endoscopy with dilation.  This was performed.  Endoscopy findings November, 2024 with some white plaques, but biopsy was negative for Candida, empiric dilation, and H. pylori gastritis.  He was treated with Prevpac.  He states he pleated his antibiotics.  He has no GI complaints today.  He is recently started seeing a pulmonologist daily.  We reviewed Dr. Shaver's notes. Sb

## 2025-03-05 ENCOUNTER — TELEPHONE ENCOUNTER (OUTPATIENT)
Dept: URBAN - NONMETROPOLITAN AREA CLINIC 2 | Facility: CLINIC | Age: 70
End: 2025-03-05

## 2025-03-05 RX ORDER — TETRACYCLINE HYDROCHLORIDE 500 MG/1
1 CAPSULE ON AN EMPTY STOMACH CAPSULE ORAL
Qty: 56 CAPSULE | Refills: 0 | OUTPATIENT
Start: 2025-03-05 | End: 2025-03-19

## 2025-03-05 RX ORDER — OMEPRAZOLE 20 MG/1
1 CAPSULE CAPSULE, DELAYED RELEASE ORAL TWICE DAILY
Qty: 28 CAPSULE | Refills: 0 | OUTPATIENT
Start: 2025-03-05

## 2025-03-05 RX ORDER — METRONIDAZOLE 500 MG/1
1 TABLET TABLET ORAL THREE TIMES A DAY
Qty: 42 TABLET | Refills: 0 | OUTPATIENT
Start: 2025-03-05 | End: 2025-03-19

## 2025-03-05 RX ORDER — BISMUTH SUBSALICYLATE 262 MG
TAKE 1 TABLET TABLET,CHEWABLE ORAL
Qty: 56 TABLET | Refills: 0 | OUTPATIENT
Start: 2025-03-05 | End: 2025-03-19

## 2025-04-03 ENCOUNTER — TELEPHONE ENCOUNTER (OUTPATIENT)
Dept: URBAN - NONMETROPOLITAN AREA CLINIC 2 | Facility: CLINIC | Age: 70
End: 2025-04-03

## 2025-04-09 ENCOUNTER — TELEPHONE ENCOUNTER (OUTPATIENT)
Dept: URBAN - NONMETROPOLITAN AREA CLINIC 2 | Facility: CLINIC | Age: 70
End: 2025-04-09

## 2025-04-24 LAB — H PYLORI BREATH TEST: DETECTED

## 2025-04-28 ENCOUNTER — TELEPHONE ENCOUNTER (OUTPATIENT)
Dept: URBAN - NONMETROPOLITAN AREA CLINIC 2 | Facility: CLINIC | Age: 70
End: 2025-04-28

## 2025-04-28 RX ORDER — CLARITHROMYCIN 500 MG/1
1 TABLET TABLET, FILM COATED ORAL
Qty: 28 TABLET | Refills: 0 | OUTPATIENT
Start: 2025-04-28 | End: 2025-05-12

## 2025-04-28 RX ORDER — OMEPRAZOLE 40 MG/1
1 CAPSULE 30 MINUTES BEFORE MORNING MEAL CAPSULE, DELAYED RELEASE ORAL ONCE A DAY
Qty: 14 CAPSULE | Refills: 0 | OUTPATIENT
Start: 2025-04-28

## 2025-04-28 RX ORDER — AMOXICILLIN 500 MG/1
2 CAPSULES CAPSULE ORAL
Qty: 56 CAPSULE | Refills: 0 | OUTPATIENT
Start: 2025-04-28 | End: 2025-05-12

## 2025-05-05 ENCOUNTER — TELEPHONE ENCOUNTER (OUTPATIENT)
Dept: URBAN - NONMETROPOLITAN AREA CLINIC 2 | Facility: CLINIC | Age: 70
End: 2025-05-05

## 2025-06-20 ENCOUNTER — TELEPHONE ENCOUNTER (OUTPATIENT)
Dept: URBAN - NONMETROPOLITAN AREA CLINIC 2 | Facility: CLINIC | Age: 70
End: 2025-06-20

## 2025-06-26 ENCOUNTER — TELEPHONE ENCOUNTER (OUTPATIENT)
Dept: URBAN - NONMETROPOLITAN AREA CLINIC 2 | Facility: CLINIC | Age: 70
End: 2025-06-26

## 2025-06-26 LAB — H PYLORI BREATH TEST: NOT DETECTED

## 2025-08-27 ENCOUNTER — OFFICE VISIT (OUTPATIENT)
Dept: URBAN - NONMETROPOLITAN AREA CLINIC 2 | Facility: CLINIC | Age: 70
End: 2025-08-27
Payer: MEDICARE

## 2025-08-27 DIAGNOSIS — R13.19 ESOPHAGEAL DYSPHAGIA: ICD-10-CM

## 2025-08-27 DIAGNOSIS — D84.9 IMMUNOSUPPRESSED STATUS: ICD-10-CM

## 2025-08-27 DIAGNOSIS — A04.8 H. PYLORI INFECTION: ICD-10-CM

## 2025-08-27 DIAGNOSIS — K51.00 ULCERATIVE PANCOLITIS WITHOUT COMPLICATION: ICD-10-CM

## 2025-08-27 PROCEDURE — 99214 OFFICE O/P EST MOD 30 MIN: CPT | Performed by: NURSE PRACTITIONER

## 2025-08-27 RX ORDER — EXTENDED PHENYTOIN SODIUM 30 MG/1
CAPSULE ORAL
Qty: 0 | Refills: 0 | Status: ACTIVE | COMMUNITY
Start: 1900-01-01

## 2025-08-27 RX ORDER — OMEPRAZOLE 20 MG/1
1 CAPSULE CAPSULE, DELAYED RELEASE ORAL TWICE DAILY
Qty: 28 CAPSULE | Refills: 0 | Status: ACTIVE | COMMUNITY
Start: 2024-11-20

## 2025-08-27 RX ORDER — BALSALAZIDE DISODIUM 750 MG/1
3 CAPSULES CAPSULE ORAL
Qty: 810 | Refills: 3 | Status: ACTIVE | COMMUNITY

## 2025-08-27 RX ORDER — OMEPRAZOLE 20 MG/1
1 CAPSULE CAPSULE, DELAYED RELEASE ORAL TWICE DAILY
Qty: 28 CAPSULE | Refills: 0 | Status: ACTIVE | COMMUNITY
Start: 2025-03-05

## 2025-08-27 RX ORDER — OMEPRAZOLE 40 MG/1
1 CAPSULE 30 MINUTES BEFORE MORNING MEAL CAPSULE, DELAYED RELEASE ORAL ONCE A DAY
Qty: 14 CAPSULE | Refills: 0 | Status: ACTIVE | COMMUNITY
Start: 2025-04-28

## 2025-08-27 RX ORDER — BALSALAZIDE DISODIUM 750 MG/1
CAPSULE ORAL
Qty: 0 | Refills: 0 | Status: ACTIVE | COMMUNITY
Start: 1900-01-01

## 2025-08-27 RX ORDER — MESALAMINE 1000 MG/1
USE 1 SUPPOSITORY RECTALLY AT BEDTIME ONCE DAILY SUPPOSITORY RECTAL
Qty: 90 SUPPOSITORIES | Refills: 3 | Status: ACTIVE | COMMUNITY

## 2025-08-27 RX ORDER — AMLODIPINE BESYLATE 10 MG/1
TABLET ORAL
Qty: 0 | Refills: 0 | Status: ACTIVE | COMMUNITY
Start: 1900-01-01

## 2025-08-27 RX ORDER — ATORVASTATIN CALCIUM 20 MG/1
TABLET, FILM COATED ORAL
Qty: 0 | Refills: 0 | Status: ACTIVE | COMMUNITY
Start: 1900-01-01